# Patient Record
Sex: FEMALE | Race: BLACK OR AFRICAN AMERICAN | Employment: FULL TIME | URBAN - METROPOLITAN AREA
[De-identification: names, ages, dates, MRNs, and addresses within clinical notes are randomized per-mention and may not be internally consistent; named-entity substitution may affect disease eponyms.]

---

## 2018-09-21 ENCOUNTER — HOSPITAL ENCOUNTER (EMERGENCY)
Age: 28
Discharge: HOME | End: 2018-09-21
Attending: OBSTETRICS & GYNECOLOGY | Admitting: OBSTETRICS & GYNECOLOGY
Payer: COMMERCIAL

## 2018-09-21 VITALS — HEART RATE: 88 BPM | TEMPERATURE: 98.2 F | DIASTOLIC BLOOD PRESSURE: 62 MMHG | SYSTOLIC BLOOD PRESSURE: 105 MMHG

## 2018-09-21 PROBLEM — Z34.90 PREGNANCY: Status: ACTIVE | Noted: 2018-09-21

## 2018-09-21 LAB
APPEARANCE UR: CLEAR
BILIRUB UR QL: NEGATIVE
COLOR UR: YELLOW
GLUCOSE UR QL STRIP.AUTO: NEGATIVE MG/DL
KETONES UR-MCNC: NEGATIVE MG/DL
LEUKOCYTE ESTERASE UR QL STRIP: NEGATIVE
NITRITE UR QL: NEGATIVE
PH UR: 7 [PH] (ref 5–9)
PROT UR QL: NEGATIVE MG/DL
RBC # UR STRIP: NEGATIVE /UL
SERVICE CMNT-IMP: ABNORMAL
SP GR UR: 1.02 (ref 1–1.02)
UROBILINOGEN UR QL: 2 EU/DL (ref 0.2–1)

## 2018-09-21 PROCEDURE — 74011250637 HC RX REV CODE- 250/637: Performed by: OBSTETRICS & GYNECOLOGY

## 2018-09-21 PROCEDURE — 99284 EMERGENCY DEPT VISIT MOD MDM: CPT

## 2018-09-21 PROCEDURE — 59025 FETAL NON-STRESS TEST: CPT

## 2018-09-21 PROCEDURE — 81003 URINALYSIS AUTO W/O SCOPE: CPT

## 2018-09-21 PROCEDURE — 96360 HYDRATION IV INFUSION INIT: CPT

## 2018-09-21 RX ORDER — ACETAMINOPHEN 500 MG
1000 TABLET ORAL ONCE
Status: COMPLETED | OUTPATIENT
Start: 2018-09-21 | End: 2018-09-21

## 2018-09-21 RX ORDER — SODIUM CHLORIDE, SODIUM LACTATE, POTASSIUM CHLORIDE, CALCIUM CHLORIDE 600; 310; 30; 20 MG/100ML; MG/100ML; MG/100ML; MG/100ML
999 INJECTION, SOLUTION INTRAVENOUS CONTINUOUS
Status: DISCONTINUED | OUTPATIENT
Start: 2018-09-21 | End: 2018-09-21 | Stop reason: HOSPADM

## 2018-09-21 RX ADMIN — ACETAMINOPHEN 1000 MG: 500 TABLET, FILM COATED ORAL at 17:03

## 2018-09-21 NOTE — PROGRESS NOTES
conducted an initial consultation and Spiritual Assessment for Dioni Fernandez, who is a 28 y.o.,female. Patients Primary Language is: [place]. According to the patients EMR Zoroastrian Affiliation is: [place]. The reason the Patient came to the hospital is:  
Patient Active Problem List  
 Diagnosis Date Noted  Pregnancy 09/21/2018 The  provided the following Interventions: 
Initiated a relationship of care and support. Explored issues of adam, belief, spirituality and Bahai/ritual needs while hospitalized. Listened empathically. Provided chaplaincy education. Provided information about Spiritual Care Services. Offered prayer and assurance of continued prayers on patient's behalf. Chart reviewed. The following outcomes where achieved: 
Patient shared limited information about both their medical narrative and spiritual journey/beliefs.  confirmed Patient's Mandaeism Zoroastrian Affiliation. Patient processed feeling about current hospitalization. Other family members present at the time of visit were the patient's two children and her spouse. Patient expressed gratitude for 's visit. Assessment: 
Patient does not have any Bahai/cultural needs that will affect patients preferences in health care. There are no spiritual or Bahai issues which require intervention at this time. Plan: 
Chaplains will continue to follow and will provide pastoral care on an as needed/requested basis.  recommends bedside caregivers page  on duty if patient shows signs of acute spiritual or emotional distress. Chaplain Resident Emeli Schmitt Spiritual Care  
[phone]

## 2018-09-21 NOTE — IP AVS SNAPSHOT
[address] Patient: Dioni Fernandez MRN: [id number] IGA:[birth year] About your hospitalization You were admitted on:  N/A You last received care in the:  [facility] [address] [facility] You were discharged on:  September 21, 2018 Why you were hospitalized Your primary diagnosis was:  Not on File Your diagnoses also included:  Pregnancy Follow-up Information None Discharge Orders None A check moni indicates which time of day the medication should be taken. My Medications ASK your doctor about these medications Instructions Each Dose to Equal  
 Morning Noon Evening Bedtime  
 methylPREDNISolone 4 mg tablet Commonly known as:  MEDROL (PAT) Your last dose was: Your next dose is: Take as prescribed. PRENATAL 1+1 PO Your last dose was: Your next dose is: Take  by mouth. Discharge Instructions None Introducing [facility] [place] [facility]! [employer] introduces [employer] patient portal. Now you can access parts of your medical record, email your doctor's office, and request medication refills online. 1. In your internet browser, go to [email or web address] 2. Click on the First Time User? Click Here link in the Sign In box. You will see the New Member Sign Up page. 3. Enter your [employer] Access Code exactly as it appears below. You will not need to use this code after youve completed the sign-up process. If you do not sign up before the expiration date, you must request a new code. · [employer] Access Code: [email or web address] Expires: 10/22/2018 10:59 PM 
 
4. Enter the last four digits of your Social Security Number (xxxx) and Date of Birth (mm/dd/yyyy) as indicated and click Submit. You will be taken to the next sign-up page. 5. Create a [employer] ID. This will be your [employer] login ID and cannot be changed, so think of one that is secure and easy to remember. 6. Create a [employer] password. You can change your password at any time. 7. Enter your Password Reset Question and Answer. This can be used at a later time if you forget your password. 8. Enter your e-mail address. You will receive e-mail notification when new information is available in [facility] 9. Click Sign Up. You can now view and download portions of your medical record. 10. Click the Download Summary menu link to download a portable copy of your medical information. If you have questions, please visit the Frequently Asked Questions section of the [employer] website. Remember, [employer] is NOT to be used for urgent needs. For medical emergencies, dial 911. Now available from your iPhone and Android! Introducing Jose Manuel Kelly As a [facility] patient, I wanted to make you aware of our electronic visit tool called Jose Manuel Salvadormiriamalvarez. [facility] 24/7 allows you to connect within minutes with a medical provider 24 hours a day, seven days a week via a mobile device or tablet or logging into a secure website from your computer. You can access Jose Manuel Kelly from anywhere in the [place]. A virtual visit might be right for you when you have a simple condition and feel like you just dont want to get out of bed, or cant get away from work for an appointment, when your regular [facility] provider is not available (evenings, weekends or holidays), or when youre out of town and need minor care. Electronic visits cost only $49 and if the [facility] 24/7 provider determines a prescription is needed to treat your condition, one can be electronically transmitted to a nearby pharmacy*. Please take a moment to enroll today if you have not already done so. The enrollment process is free and takes just a few minutes.   To enroll, please download the [employer] 24/7 judie to your tablet or phone, or visit [email or web address] to enroll on your computer. And, as an [address] [place] [address] patient with a Basic[employer] account, the results of your visits will be scanned into your electronic medical record and your primary care provider will be able to view the scanned results. We urge you to continue to see your regular [employer] provider for your ongoing medical care. And while your primary care provider may not be the one available when you seek a [employer]Sphere virtual visit, the peace of mind you get from getting a real diagnosis real time can be priceless. For more information on [employer]Sphere, view our Frequently Asked Questions (FAQs) at [email or web address]. Sincerely, 
 
Louise Kemp MD 
Chief Medical Officer [facility] *:  certain medications cannot be prescribed via EndoSphere Providers Seen During Your Hospitalization Provider Specialty Primary office phone Tiana Richardson MD Obstetrics & Gynecology [phone] Your Primary Care Physician (PCP) Primary Care Physician Office Phone Office Fax Kay Vasquez, [address] [place] [address] [phone] You are allergic to the following Allergen Reactions Nickel Rash Other Plant, Animal, Environmental Rash Itching Ultrasound gel. Use [place] jelly for monitorng Recent Documentation OB Status Smoking Status Pregnant Never Smoker Emergency Contacts Name Discharge Info Relation Home Work Mobile 9120 [employer] CAREGIVER [3] Parent [1] [phone] Patient Belongings The following personal items are in your possession at time of discharge: 
                             
 
  
  
Discharge Instructions Attachments/References PREGNANCY: WHEN TO CALL (AFTER 20 WEEKS): GENERAL INFO (ENGLISH) PREGNANCY: WEEKS 32 TO 34 (ENGLISH) Patient Handouts Learning About When to Call Your Doctor During Pregnancy (After 20 Weeks) Your Care Instructions It's common to have concerns about what might be a problem during pregnancy. Although most pregnant women don't have any serious problems, it's important to know when to call your doctor if you have certain symptoms or signs of labor. These are general suggestions. Your doctor may give you some more information about when to call. When to call your doctor (after 20 weeks) Call 911 anytime you think you may need emergency care. For example, call if: 
· You have severe vaginal bleeding. · You have sudden, severe pain in your belly. · You passed out (lost consciousness). · You have a seizure. · You see or feel the umbilical cord. · You think you are about to deliver your baby and can't make it safely to the hospital. 
Call your doctor now or seek immediate medical care if: 
· You have vaginal bleeding. · You have belly pain. · You have a fever. · You have symptoms of preeclampsia, such as: 
¨ Sudden swelling of your face, hands, or feet. ¨ New vision problems (such as dimness or blurring). ¨ A severe headache. · You have a sudden release of fluid from your vagina. (You think your water broke.) · You think that you may be in labor. This means that you've had at least 4 contractions within 20 minutes or at least 8 contractions in an hour. · You notice that your baby has stopped moving or is moving much less than normal. 
· You have symptoms of a urinary tract infection. These may include: 
¨ Pain or burning when you urinate. ¨ A frequent need to urinate without being able to pass much urine. ¨ Pain in the flank, which is just below the rib cage and above the waist on either side of the back. ¨ Blood in your urine. Watch closely for changes in your health, and be sure to contact your doctor if: 
· You have vaginal discharge that smells bad. · You have skin changes, such as: ¨ A rash. ¨ Itching. ¨ Yellow color to your skin. · You have other concerns about your pregnancy. If you have labor signs at 37 weeks or more If you have signs of labor at 37 weeks or more, your doctor may tell you to call when your labor becomes more active. Symptoms of active labor include: 
· Contractions that are regular. · Contractions that are less than 5 minutes apart. · Contractions that are hard to talk through. Follow-up care is a key part of your treatment and safety. Be sure to make and go to all appointments, and call your doctor if you are having problems. It's also a good idea to know your test results and keep a list of the medicines you take. Where can you learn more? Go to http://libra-shea.info/. Enter  in the search box to learn more about \"Learning About When to Call Your Doctor During Pregnancy (After 20 Weeks). \" 
Current as of: November 21, 2017 Content Version: 11.7 © 8224-6954 [employer]. Care instructions adapted under license by [employer] (which disclaims liability or warranty for this information). If you have questions about a medical condition or this instruction, always ask your healthcare professional. [place] [address] any warranty or liability for your use of this information. Weeks 32 to 34 of Your Pregnancy: Care Instructions Your Care Instructions During the last few weeks of your pregnancy, you may have more aches and pains. It's important to rest when you can. Your growing baby is putting more pressure on your bladder. So you may need to urinate more often. Hemorrhoids are also common. These are painful, itchy veins in the rectal area. In the 36th week, most women have a test for group B streptococcus (GBS). GBS is a common bacteria that can live in the vagina and rectum. It can make your baby sick after birth.  If you test positive, you will get antibiotics during labor. These will keep your baby from getting the bacteria. You may want to talk with your doctor about banking your baby's umbilical cord blood. This is the blood left in the cord after birth. If you want to save this blood, you must arrange it ahead of time. You can't decide at the last minute. If you haven't already had the Tdap shot during this pregnancy, talk to your doctor about getting it. It will help protect your  against pertussis infection. Follow-up care is a key part of your treatment and safety. Be sure to make and go to all appointments, and call your doctor if you are having problems. It's also a good idea to know your test results and keep a list of the medicines you take. How can you care for yourself at home? Ease hemorrhoids · Get more liquids, fruits, vegetables, and fiber in your diet. This will help keep your stools soft. · Avoid sitting for too long. Lie on your left side several times a day. · Clean yourself with soft, moist toilet paper. Or you can use witch hazel pads or personal hygiene pads. · If you are uncomfortable, try ice packs. Or you can sit in a warm sitz bath. Do these for 20 minutes at a time, as needed. · Use hydrocortisone cream for pain and itching. Two examples are Anusol and Preparation H Hydrocortisone. · Ask your doctor about taking an over-the-counter stool softener. Consider breastfeeding · Experts recommend that women breastfeed for 1 year or longer. Breast milk is the perfect food for babies. · Breast milk is easier for babies to digest than formula. And it is always available, just the right temperature, and free. · Breast milk may help protect your child from some health problems.  babies are less likely than formula-fed babies to: ¨ Get ear infections, colds, diarrhea, and pneumonia. ¨ Be obese or get diabetes later in life. · Women who breastfeed have less bleeding after the birth.  Their uteruses also shrink back faster. · Some women who breastfeed lose weight faster. Making milk burns calories. · Breastfeeding can lower your risk of breast cancer, ovarian cancer, and osteoporosis. Decide about circumcision for boys · As you make this decision, it may help to think about your personal, Denominational, and family traditions. You get to decide if you will keep your son's penis natural or if he will be circumcised. · If you decide that you would like to have your baby circumcised, talk with your doctor. You can share your concerns about pain. And you can discuss your preferences for anesthesia. Where can you learn more? Go to http://libra-shea.info/. Enter H505 in the search box to learn more about \"Weeks 32 to 34 of Your Pregnancy: Care Instructions. \" Current as of: November 21, 2017 Content Version: 11.7 © 3567-6439 [employer], [employer]urn[employer]. Care instructions adapted under license by [employer] (which disclaims liability or warranty for this information). If you have questions about a medical condition or this instruction, always ask your healthcare professional. [address] any warranty or liability for your use of this information. Please provide this summary of care documentation to your next provider. Signatures-by signing, you are acknowledging that this After Visit Summary has been reviewed with you and you have received a copy. Patient Signature:  ____________________________________________________________ Date:  ____________________________________________________________  
  
Stalin Blue Provider Signature:  ____________________________________________________________ Date:  ____________________________________________________________

## 2018-09-21 NOTE — IP AVS SNAPSHOT
[address] Patient: Sandi Smith MRN: [id number] MG:0/3/8812 A check moni indicates which time of day the medication should be taken. My Medications ASK your doctor about these medications Instructions Each Dose to Equal  
 Morning Noon Evening Bedtime  
 methylPREDNISolone 4 mg tablet Commonly known as:  MEDROL (PAT) Your last dose was: Your next dose is: Take as prescribed. PRENATAL 1+1 PO Your last dose was: Your next dose is: Take  by mouth.

## 2018-09-21 NOTE — PROGRESS NOTES
1330-Dr. Stone given update. Telephone order to give IV LR for irritability on TOCO tracing. 1500-Dr. Stone give orders to recheck cervix for cervical change. 1710-Pt declined tylenol, pt states she can not swallow pills. 1710-Sve closed, thick and floating 125-Pt ambulated off unit in stable condition.

## 2018-09-21 NOTE — PROGRESS NOTES
Patient presents to labor and delivery  33weeks 3days by medic for abdominal pain, patient has an OB that delivers at [facility].  Patient states pain started @ hour ago and has not gotten worse. Triage assessment performed, TOCO and ultrasound applied to abdomin, abdomin soft to palpate. 6321- Patient up to restroom - spoke with Dr. Keegan Chavez, Verbal order for discharge - Discharge instructions given to patient, patient verbalized understanding

## 2018-09-21 NOTE — H&P
High Risk Obstetrics Progress Note Name: Aden Arteaga MRN: [id number]  SSN: [id number] YOB: 1990  Age: 29 y.o. Sex: female Subjective: LOS: 0 days Estimated Date of Delivery: 18 Gestational Age Today: 27w4d Patient admitted for evaluation of labor. She denies any vaginal bleeding or PROM. States she does have mild abdominal pain and mild contractions and does not have chest pain, headache , nausea and vomiting, pelvic pressure, vaginal bleeding  and vaginal leaking of fluid . Objective:  
 
Vitals:  Blood pressure 105/62, pulse 88, temperature 98.2 °F (36.8 °C), unknown if currently breastfeeding. Temp (24hrs), Av.2 °F (36.8 °C), Min:98.2 °F (36.8 °C), Max:98.2 °F (36.8 °C) Systolic (80MCO), WNU:406 , Min:105 , Max:105 Diastolic (17ZSL), NU, Min:62, Max:62 Intake and Output:    
  
 
Physical Exam: 
Patient without distress. Abdomen: soft, nontender Fundus: soft and non tender Perineum: blood absent, amniotic fluid absent Cervical Exam: Closed/Thick/High    
 
Membranes:  Intact Uterine Activity:  Frequency: irregular and duration: 20-30 seconds Fetal Heart Rate:  Reactive Baseline: 150 per minute Accelerations: yes Labs:  
Recent Results (from the past 36 hour(s)) POC URINE MACROSCOPIC Collection Time: 18  1:18 PM  
Result Value Ref Range Color YELLOW Appearance CLEAR Spec. gravity (POC) 1.020 1.001 - 1.023    
 pH, urine  (POC) 7.0 5.0 - 9.0 Protein (POC) NEGATIVE  NEG mg/dL Glucose, urine (POC) NEGATIVE  NEG mg/dL Ketones (POC) NEGATIVE  NEG mg/dL Bilirubin (POC) NEGATIVE  NEG Blood (POC) NEGATIVE  NEG Urobilinogen (POC) 2.0 (H) 0.2 - 1.0 EU/dL Nitrite (POC) NEGATIVE  NEG Leukocyte esterase (POC) NEGATIVE  NEG Performed by [facility] Service Assessment and Plan:  
33w3d Pregnancy for evaluation of labor. Normal reactive NST. She was hydrated and discharged. Advised to return prn. Signed By: Venu Pulliam MD   
 September 21, 2018

## 2018-09-21 NOTE — DISCHARGE INSTRUCTIONS
Prenatal Discharge Instructions  Follow up appointment: Ensure to keep your scheduled appointment with your OB     Diet: As tolerated     Activity: As tolerated     Medications:  As prescribed     Call your physician or return to Labor and Delivery if any of the following symptoms occur:   Signs of labor (contractions every 5-10 minutes for 1 hour)   Vaginal bleeding   Rupture of amniotic membranes (water breaks)   Fever   Decreased fetal movement (less than 5-10 movements in 1 hour)

## 2018-10-10 PROBLEM — O99.013 ANEMIA, ANTEPARTUM, THIRD TRIMESTER: Status: ACTIVE | Noted: 2018-10-10

## 2018-10-10 PROBLEM — O13.3 GESTATIONAL HYPERTENSION, THIRD TRIMESTER: Status: ACTIVE | Noted: 2018-10-10

## 2018-10-10 PROBLEM — O36.5930 POOR FETAL GROWTH AFFECTING MANAGEMENT OF MOTHER IN THIRD TRIMESTER: Status: ACTIVE | Noted: 2018-10-10

## 2018-10-10 PROBLEM — O14.90 PREECLAMPSIA: Status: ACTIVE | Noted: 2018-10-10
